# Patient Record
Sex: MALE | Race: WHITE | NOT HISPANIC OR LATINO | URBAN - METROPOLITAN AREA
[De-identification: names, ages, dates, MRNs, and addresses within clinical notes are randomized per-mention and may not be internally consistent; named-entity substitution may affect disease eponyms.]

---

## 2024-01-18 ENCOUNTER — OFFICE VISIT (OUTPATIENT)
Dept: PRIMARY CARE | Facility: CLINIC | Age: 34
End: 2024-01-18
Payer: COMMERCIAL

## 2024-01-18 VITALS
DIASTOLIC BLOOD PRESSURE: 90 MMHG | OXYGEN SATURATION: 95 % | BODY MASS INDEX: 30.34 KG/M2 | HEIGHT: 72 IN | HEART RATE: 96 BPM | TEMPERATURE: 97.7 F | WEIGHT: 224 LBS | SYSTOLIC BLOOD PRESSURE: 131 MMHG

## 2024-01-18 DIAGNOSIS — R21 RASH: Primary | ICD-10-CM

## 2024-01-18 PROBLEM — L50.8 ACUTE URTICARIA: Status: ACTIVE | Noted: 2024-01-18

## 2024-01-18 PROBLEM — E66.9 OBESITY (BMI 30-39.9): Status: ACTIVE | Noted: 2024-01-18

## 2024-01-18 PROCEDURE — 1036F TOBACCO NON-USER: CPT | Performed by: STUDENT IN AN ORGANIZED HEALTH CARE EDUCATION/TRAINING PROGRAM

## 2024-01-18 PROCEDURE — 99213 OFFICE O/P EST LOW 20 MIN: CPT | Performed by: STUDENT IN AN ORGANIZED HEALTH CARE EDUCATION/TRAINING PROGRAM

## 2024-01-18 RX ORDER — CETIRIZINE HYDROCHLORIDE 10 MG/1
10 TABLET ORAL
COMMUNITY
Start: 2022-10-23

## 2024-01-18 RX ORDER — PREDNISONE 10 MG/1
TABLET ORAL
COMMUNITY
Start: 2024-01-16

## 2024-01-18 NOTE — PROGRESS NOTES
Subjective   Patient ID: Paramjit Guerrero is a 33 y.o. male who presents for No chief complaint on file..    HPI   Re: Rash - recent URI that he got over. Kids otherwise healthy. Three day history of rash diffusely, present on palms (not soles however), trunk, and knees and chest. Seen by [facility], given steroids and H2 blocker now markedly better. No oral lesions, but had one a year ago in a similar situation.    PMHx, FHx, Social Hx, Surg Hx personally reviewed at this appointment. No pertinent findings and/or changes from prior (if applicable).    ROS: Denies wt gain/loss f/c HA LoC CP SOB NVDC. See HPI above, and scanned sheet (if applicable). All other systems are reviewed and are without complaint.     Review of Systems    Objective   /90   Pulse 96   Temp 36.5 °C (97.7 °F)   Ht 1.829 m (6')   Wt 102 kg (224 lb)   SpO2 95%   BMI 30.38 kg/m²     Physical Exam  Gen: well developed in NAD. AAO x3.  HEENT: NC/AT. Anicteric sclera, symmetric pupils. MMM no thrush.  Neck: Soft, supple. No LAD. No goiter.   CV: RRR nl s1s2 no m/r/g  Pulm: CTAB no w/r/r, good air exchange  GI: soft NTND BS+ no hsm  Ext: WWP no edema  Neuro: II-XII grossly intact, nonfocal systemic findings  MSK: 5/5 strength b/l UE and LE  Gait: unremarkable  Skin: resolving rash on palms, trunk, and chest  : normal penis, small chronic skin lesion superior to base of penis      Assessment/Plan   # Rash, suspect post-viral exanthem +/- allergies  - continue oral steroids  - continue H2 blocker  - if sx persist, refer to derm for further testing

## 2024-01-18 NOTE — PATIENT INSTRUCTIONS
Your blood work is up to date; no need for additional draw at this appointment    Continue your steroids and antihistamine    I suspect this rash will improve with time. I believe it's related to a recent infection.    Follow up with me if no improvement by the end of the course of meds.